# Patient Record
Sex: FEMALE | Race: BLACK OR AFRICAN AMERICAN | NOT HISPANIC OR LATINO | Employment: FULL TIME | RURAL
[De-identification: names, ages, dates, MRNs, and addresses within clinical notes are randomized per-mention and may not be internally consistent; named-entity substitution may affect disease eponyms.]

---

## 2017-01-14 ENCOUNTER — OFFICE VISIT (OUTPATIENT)
Dept: RETAIL CLINIC | Facility: CLINIC | Age: 52
End: 2017-01-14

## 2017-01-14 VITALS
BODY MASS INDEX: 47.09 KG/M2 | HEIGHT: 66 IN | OXYGEN SATURATION: 99 % | HEART RATE: 100 BPM | DIASTOLIC BLOOD PRESSURE: 70 MMHG | SYSTOLIC BLOOD PRESSURE: 128 MMHG | TEMPERATURE: 99.5 F | WEIGHT: 293 LBS

## 2017-01-14 DIAGNOSIS — H65.193 OTHER ACUTE NONSUPPURATIVE OTITIS MEDIA OF BOTH EARS, RECURRENCE NOT SPECIFIED: ICD-10-CM

## 2017-01-14 DIAGNOSIS — J01.10 ACUTE FRONTAL SINUSITIS, RECURRENCE NOT SPECIFIED: Primary | ICD-10-CM

## 2017-01-14 PROCEDURE — 99213 OFFICE O/P EST LOW 20 MIN: CPT | Performed by: NURSE PRACTITIONER

## 2017-01-14 PROCEDURE — 96372 THER/PROPH/DIAG INJ SC/IM: CPT | Performed by: NURSE PRACTITIONER

## 2017-01-14 RX ORDER — DEXAMETHASONE SODIUM PHOSPHATE 4 MG/ML
8 INJECTION, SOLUTION INTRA-ARTICULAR; INTRALESIONAL; INTRAMUSCULAR; INTRAVENOUS; SOFT TISSUE ONCE
Status: COMPLETED | OUTPATIENT
Start: 2017-01-14 | End: 2017-01-14

## 2017-01-14 RX ORDER — CEFTRIAXONE 2 G/1
2 INJECTION, POWDER, FOR SOLUTION INTRAMUSCULAR; INTRAVENOUS ONCE
Status: COMPLETED | OUTPATIENT
Start: 2017-01-14 | End: 2017-01-14

## 2017-01-14 RX ADMIN — CEFTRIAXONE 2 G: 2 INJECTION, POWDER, FOR SOLUTION INTRAMUSCULAR; INTRAVENOUS at 10:29

## 2017-01-14 RX ADMIN — DEXAMETHASONE SODIUM PHOSPHATE 8 MG: 4 INJECTION, SOLUTION INTRA-ARTICULAR; INTRALESIONAL; INTRAMUSCULAR; INTRAVENOUS; SOFT TISSUE at 10:30

## 2017-01-14 NOTE — PROGRESS NOTES
"Subjective   Francoise Vasquez is a 51 y.o. female. Patient comes into clinic today with concerns regarding:     \"Flu, sinuses, sneezing, coughing and aching body.\"    History of Present Illness  Sx started 2 days ago. +exposure to sick family member. +fever, sinus pain, sinus congestion, sore throat, ear pain, HA, PND, dry cough.     OTC Medications used:   Theraflu    The following portions of the patient's history were reviewed and updated as appropriate: allergies, current medications, past family history, past medical history, past social history, past surgical history and problem list.    Review of Systems   Constitutional: Positive for fever.   HENT: Positive for congestion, facial swelling, postnasal drip, sinus pressure and sneezing.    Respiratory: Positive for cough. Negative for shortness of breath and wheezing.    Cardiovascular: Negative for chest pain.   Gastrointestinal: Negative for abdominal pain, diarrhea, nausea and vomiting.   Neurological: Positive for headaches.       No Known Allergies    Past Medical History   Diagnosis Date   • Acid reflux    • Arthritis    • History of ear infection    • Hypertension    • Sinusitis          Current Outpatient Prescriptions:   •  Acetaminophen-Guaifenesin (THERAFLU FLU/CHEST CONGESTION PO), Take  by mouth., Disp: , Rfl:     Current Facility-Administered Medications:   •  cefTRIAXone (ROCEPHIN) injection 2 g, 2 g, Intramuscular, Once, JOHNATHON Fajardo  •  dexamethasone (DECADRON) injection 8 mg, 8 mg, Intramuscular, Once, JOHNATHON Fajardo      Objective   Physical Exam   Constitutional: She is oriented to person, place, and time. She appears well-developed and well-nourished. She appears ill.   HENT:   Right Ear: External ear and ear canal normal. Tympanic membrane is injected and erythematous. A middle ear effusion is present.   Left Ear: External ear and ear canal normal. Tympanic membrane is injected and erythematous. A middle ear effusion is " "present.   Nose: Mucosal edema and rhinorrhea present. Right sinus exhibits frontal sinus tenderness. Left sinus exhibits frontal sinus tenderness.   Mouth/Throat: Uvula is midline. Posterior oropharyngeal erythema present. No oropharyngeal exudate or posterior oropharyngeal edema.   Eyes: Conjunctivae are normal. Pupils are equal, round, and reactive to light.   Neck: Neck supple. No thyromegaly present.   Cardiovascular: Normal rate and regular rhythm.    Pulmonary/Chest: Effort normal.   Slightly congested in bilateral upper lobes   Lymphadenopathy:        Head (right side): Submandibular, tonsillar and preauricular adenopathy present.        Head (left side): Submandibular, tonsillar and preauricular adenopathy present.     She has no cervical adenopathy.        Right: No supraclavicular adenopathy present.        Left: No supraclavicular adenopathy present.   Neurological: She is alert and oriented to person, place, and time.   CN II-XII grossly intact     Skin: Skin is warm and dry.   Psychiatric: She has a normal mood and affect. Her behavior is normal.   Vitals reviewed.      Visit Vitals   • /70 (BP Location: Left arm, Patient Position: Sitting, Cuff Size: Adult)   • Pulse 100   • Temp 99.5 °F (37.5 °C) (Tympanic)   • Ht 66\" (167.6 cm)   • Wt 294 lb (133 kg)   • SpO2 99%   • BMI 47.45 kg/m2       Assessment/Plan   Francoise was seen today for sinus problem, generalized body aches and flu symptoms.    Diagnoses and all orders for this visit:    Acute frontal sinusitis, recurrence not specified  -     cefTRIAXone (ROCEPHIN) injection 2 g; Inject 2 g into the shoulder, thigh, or buttocks 1 (One) Time.  -     dexamethasone (DECADRON) injection 8 mg; Inject 2 mL into the shoulder, thigh, or buttocks 1 (One) Time.    Other acute nonsuppurative otitis media of both ears, recurrence not specified  -     cefTRIAXone (ROCEPHIN) injection 2 g; Inject 2 g into the shoulder, thigh, or buttocks 1 (One) Time.   "     -Discussed dx with Patient  -Medication administration instructions given. 2G Rocephin and 8mg Decadron given to patient by SAVANNAH Chambers. Patient tolerated well.   -If sx worsen or do not improve seek higher level care  -Patient expressed verbal understanding of plan of care and rationale for interventions.    Declined work excuse note.

## 2017-01-14 NOTE — PATIENT INSTRUCTIONS
-Discussed dx with Patient  -Medication administration instructions given. 2G Rocephin and 8mg Decadron given to patient by SAVANNAH Chambers. Patient tolerated well.   -If sx worsen or do not improve seek higher level care  -Patient expressed verbal understanding of plan of care and rationale for interventions.    Declined work excuse note.

## 2017-01-24 ENCOUNTER — OFFICE VISIT (OUTPATIENT)
Dept: RETAIL CLINIC | Facility: CLINIC | Age: 52
End: 2017-01-24

## 2017-01-24 DIAGNOSIS — R52 PAIN AT INJECTION SITE, INITIAL ENCOUNTER: Primary | ICD-10-CM

## 2017-01-24 DIAGNOSIS — T80.89XA PAIN AT INJECTION SITE, INITIAL ENCOUNTER: Primary | ICD-10-CM

## 2017-01-24 NOTE — LETTER
January 24, 2017     Patient: Francoise Vasquez   YOB: 1965   Date of Visit: 1/24/2017       To Whom It May Concern:    It is my medical opinion that Francoise Vasquez may return to work on today.           Sincerely,        JOHNATHON Staton    PROVIDER [facility]    CC: No Recipients

## 2017-01-24 NOTE — PROGRESS NOTES
Patient seen by JOHNATHON Stubbs on 1/14/17 and given Rocephin and Decadron injections.  States that she began noticing some soreness to injection site on right gluteal area last night.  No fever or warmth to area, just TTP.  Review of chart reveals that Rocephin was received to right gluteal, Decadron on Left.  Examined area on patient and there is mild ecchymosis and TTP, but no erythema, warmth or induration.  Have discussed with patient the signs/symptoms of an abscess to watch for--increased swelling, pain, hardness over the area, heat over the area and if these occur, f/u immediately.

## 2017-03-17 ENCOUNTER — OFFICE VISIT (OUTPATIENT)
Dept: RETAIL CLINIC | Facility: CLINIC | Age: 52
End: 2017-03-17

## 2017-03-17 VITALS
OXYGEN SATURATION: 98 % | RESPIRATION RATE: 18 BRPM | WEIGHT: 293 LBS | HEIGHT: 66 IN | TEMPERATURE: 98.5 F | HEART RATE: 94 BPM | BODY MASS INDEX: 47.09 KG/M2

## 2017-03-17 DIAGNOSIS — J20.9 ACUTE BRONCHITIS, UNSPECIFIED ORGANISM: ICD-10-CM

## 2017-03-17 DIAGNOSIS — J01.90 ACUTE SINUSITIS, RECURRENCE NOT SPECIFIED, UNSPECIFIED LOCATION: Primary | ICD-10-CM

## 2017-03-17 PROCEDURE — 99213 OFFICE O/P EST LOW 20 MIN: CPT | Performed by: NURSE PRACTITIONER

## 2017-03-17 RX ORDER — AMOXICILLIN AND CLAVULANATE POTASSIUM 875; 125 MG/1; MG/1
1 TABLET, FILM COATED ORAL 2 TIMES DAILY
Qty: 20 TABLET | Refills: 0 | Status: SHIPPED | OUTPATIENT
Start: 2017-03-17

## 2017-03-17 RX ORDER — FLUCONAZOLE 150 MG/1
150 TABLET ORAL ONCE
Qty: 2 TABLET | Refills: 0 | Status: SHIPPED | OUTPATIENT
Start: 2017-03-17 | End: 2017-03-17

## 2017-03-17 RX ORDER — ALBUTEROL SULFATE 90 UG/1
2 AEROSOL, METERED RESPIRATORY (INHALATION) EVERY 4 HOURS PRN
Qty: 18 G | Refills: 0 | Status: SHIPPED | OUTPATIENT
Start: 2017-03-17

## 2017-03-17 RX ORDER — METHYLPREDNISOLONE 4 MG/1
TABLET ORAL
Qty: 21 TABLET | Refills: 0 | Status: SHIPPED | OUTPATIENT
Start: 2017-03-17

## 2017-03-17 RX ORDER — DEXTROMETHORPHAN HYDROBROMIDE AND PROMETHAZINE HYDROCHLORIDE 15; 6.25 MG/5ML; MG/5ML
SYRUP ORAL
Qty: 120 ML | Refills: 0 | Status: SHIPPED | OUTPATIENT
Start: 2017-03-17

## 2017-03-17 RX ORDER — CETIRIZINE HYDROCHLORIDE 10 MG/1
10 TABLET ORAL DAILY
Qty: 30 TABLET | Refills: 0 | Status: SHIPPED | OUTPATIENT
Start: 2017-03-17

## 2017-03-17 NOTE — PROGRESS NOTES
"Subjective   Francoise Vasquez is a 51 y.o. female.     Sinusitis   This is a new problem. The current episode started in the past 7 days. The problem is unchanged. There has been no fever. She is experiencing no pain. Associated symptoms include congestion, coughing ( worse at night), ear pain ( pressure), headaches, sinus pressure ( frontal) and a sore throat ( scratchy). Pertinent negatives include no chills, diaphoresis, hoarse voice, neck pain, shortness of breath, sneezing or swollen glands. Treatments tried: otc cough med. The treatment provided no relief.        The following portions of the patient's history were reviewed and updated as appropriate: allergies, current medications, past medical history and past social history.    Review of Systems   Constitutional: Negative for activity change, appetite change, chills, diaphoresis and fever.   HENT: Positive for congestion, ear pain ( pressure), postnasal drip, rhinorrhea ( predominantly clear, occasional yellow), sinus pressure ( frontal) and sore throat ( scratchy). Negative for hoarse voice and sneezing.    Eyes: Negative.    Respiratory: Positive for cough ( worse at night), chest tightness and wheezing ( at night). Negative for shortness of breath.    Cardiovascular: Negative.    Gastrointestinal: Negative for abdominal pain, diarrhea, nausea and vomiting.   Musculoskeletal: Negative for myalgias, neck pain and neck stiffness.   Skin: Negative.    Neurological: Positive for headaches. Negative for dizziness.   Hematological: Negative for adenopathy.   Psychiatric/Behavioral: Negative.        Objective    Visit Vitals   • Pulse 94   • Temp 98.5 °F (36.9 °C) (Tympanic)   • Resp 18   • Ht 66\" (167.6 cm)   • Wt 297 lb (135 kg)   • SpO2 98%   • Breastfeeding No   • BMI 47.94 kg/m2       Physical Exam   Constitutional: She is oriented to person, place, and time. She appears well-developed and well-nourished. No distress.   HENT:   Head: Normocephalic and " atraumatic.   Right Ear: Ear canal normal. Tympanic membrane is not erythematous ( dull, slightly decreased light reflex).   Left Ear: Tympanic membrane and ear canal normal.   Nose: Mucosal edema and rhinorrhea ( clear) present. Right sinus exhibits frontal sinus tenderness ( mild). Right sinus exhibits no maxillary sinus tenderness. Left sinus exhibits frontal sinus tenderness ( mild). Left sinus exhibits no maxillary sinus tenderness.   Mouth/Throat: Uvula is midline and mucous membranes are normal. No oropharyngeal exudate or posterior oropharyngeal erythema ( +PND).   Eyes: Conjunctivae are normal.   Neck: Normal range of motion. Neck supple.   Cardiovascular: Normal rate and regular rhythm.    Pulmonary/Chest: Effort normal. She has wheezes (slightly decreased aeration with tight, wheezy cough). She has no rales.   Lymphadenopathy:     She has no cervical adenopathy.   Neurological: She is alert and oriented to person, place, and time.   Psychiatric: She has a normal mood and affect. Her behavior is normal.   Nursing note and vitals reviewed.      Assessment/Plan   Francoise was seen today for sinusitis.    Diagnoses and all orders for this visit:    Acute sinusitis, recurrence not specified, unspecified location  -     MethylPREDNISolone (MEDROL, DANNY,) 4 MG tablet; Take as directed on package instructions.  -     cetirizine (zyrTEC) 10 MG tablet; Take 1 tablet by mouth Daily.  -     amoxicillin-clavulanate (AUGMENTIN) 875-125 MG per tablet; Take 1 tablet by mouth 2 (Two) Times a Day. If worsening sinus symptoms over the next week    Acute bronchitis, unspecified organism  -     MethylPREDNISolone (MEDROL, DANNY,) 4 MG tablet; Take as directed on package instructions.  -     albuterol (PROVENTIL HFA;VENTOLIN HFA) 108 (90 BASE) MCG/ACT inhaler; Inhale 2 puffs Every 4 (Four) Hours As Needed for Wheezing or Shortness of Air.  -     promethazine-dextromethorphan (PROMETHAZINE-DM) 6.25-15 MG/5ML syrup; 1-2 tsp po QHS prn  cough    Other orders  -     fluconazole (DIFLUCAN) 150 MG tablet; Take 1 tablet by mouth 1 (One) Time for 1 dose. Prn yeast.  May repeat in 4-7 days      Push fluids  Rest  Tylenol or Motrin as needed  Appears more allergy/viral at this time.  Augmentin only if persistent purulent nasal discharge, localized sinus pain or fever.      RTW:  3-20-17       PCP if symptoms persist/worsen

## 2017-03-17 NOTE — PATIENT INSTRUCTIONS
Acute Bronchitis  Bronchitis is inflammation of the airways that extend from the windpipe into the lungs (bronchi). The inflammation often causes mucus to develop. This leads to a cough, which is the most common symptom of bronchitis.   In acute bronchitis, the condition usually develops suddenly and goes away over time, usually in a couple weeks. Smoking, allergies, and asthma can make bronchitis worse. Repeated episodes of bronchitis may cause further lung problems.   CAUSES  Acute bronchitis is most often caused by the same virus that causes a cold. The virus can spread from person to person (contagious) through coughing, sneezing, and touching contaminated objects.  SIGNS AND SYMPTOMS   · Cough.    · Fever.    · Coughing up mucus.    · Body aches.    · Chest congestion.    · Chills.    · Shortness of breath.    · Sore throat.    DIAGNOSIS   Acute bronchitis is usually diagnosed through a physical exam. Your health care provider will also ask you questions about your medical history. Tests, such as chest X-rays, are sometimes done to rule out other conditions.   TREATMENT   Acute bronchitis usually goes away in a couple weeks. Oftentimes, no medical treatment is necessary. Medicines are sometimes given for relief of fever or cough. Antibiotic medicines are usually not needed but may be prescribed in certain situations. In some cases, an inhaler may be recommended to help reduce shortness of breath and control the cough. A cool mist vaporizer may also be used to help thin bronchial secretions and make it easier to clear the chest.   HOME CARE INSTRUCTIONS  · Get plenty of rest.    · Drink enough fluids to keep your urine clear or pale yellow (unless you have a medical condition that requires fluid restriction). Increasing fluids may help thin your respiratory secretions (sputum) and reduce chest congestion, and it will prevent dehydration.    · Take medicines only as directed by your health care provider.  · If  you were prescribed an antibiotic medicine, finish it all even if you start to feel better.  · Avoid smoking and secondhand smoke. Exposure to cigarette smoke or irritating chemicals will make bronchitis worse. If you are a smoker, consider using nicotine gum or skin patches to help control withdrawal symptoms. Quitting smoking will help your lungs heal faster.    · Reduce the chances of another bout of acute bronchitis by washing your hands frequently, avoiding people with cold symptoms, and trying not to touch your hands to your mouth, nose, or eyes.    · Keep all follow-up visits as directed by your health care provider.    SEEK MEDICAL CARE IF:  Your symptoms do not improve after 1 week of treatment.   SEEK IMMEDIATE MEDICAL CARE IF:  · You develop an increased fever or chills.    · You have chest pain.    · You have severe shortness of breath.  · You have bloody sputum.    · You develop dehydration.  · You faint or repeatedly feel like you are going to pass out.  · You develop repeated vomiting.  · You develop a severe headache.  MAKE SURE YOU:   · Understand these instructions.  · Will watch your condition.  · Will get help right away if you are not doing well or get worse.     This information is not intended to replace advice given to you by your health care provider. Make sure you discuss any questions you have with your health care provider.     Document Released: 01/25/2006 Document Revised: 01/08/2016 Document Reviewed: 06/10/2014  [employer] Interactive Patient Education ©2016 [employer]  Sinusitis, Adult  Sinusitis is redness, soreness, and inflammation of the paranasal sinuses. Paranasal sinuses are air pockets within the bones of your face. They are located beneath your eyes, in the middle of your forehead, and above your eyes. In healthy paranasal sinuses, mucus is able to drain out, and air is able to circulate through them by way of your nose. However, when your paranasal sinuses are inflamed,  mucus and air can become trapped. This can allow bacteria and other germs to grow and cause infection.  Sinusitis can develop quickly and last only a short time (acute) or continue over a long period (chronic). Sinusitis that lasts for more than 12 weeks is considered chronic.  CAUSES  Causes of sinusitis include:  · Allergies.  · Structural abnormalities, such as displacement of the cartilage that separates your nostrils (deviated septum), which can decrease the air flow through your nose and sinuses and affect sinus drainage.  · Functional abnormalities, such as when the small hairs (cilia) that line your sinuses and help remove mucus do not work properly or are not present.  SIGNS AND SYMPTOMS  Symptoms of acute and chronic sinusitis are the same. The primary symptoms are pain and pressure around the affected sinuses. Other symptoms include:  · Upper toothache.  · Earache.  · Headache.  · Bad breath.  · Decreased sense of smell and taste.  · A cough, which worsens when you are lying flat.  · Fatigue.  · Fever.  · Thick drainage from your nose, which often is green and may contain pus (purulent).  · Swelling and warmth over the affected sinuses.  DIAGNOSIS  Your health care provider will perform a physical exam. During your exam, your health care provider may perform any of the following to help determine if you have acute sinusitis or chronic sinusitis:  · Look in your nose for signs of abnormal growths in your nostrils (nasal polyps).  · Tap over the affected sinus to check for signs of infection.  · View the inside of your sinuses using an imaging device that has a light attached (endoscope).  If your health care provider suspects that you have chronic sinusitis, one or more of the following tests may be recommended:  · Allergy tests.  · Nasal culture. A sample of mucus is taken from your nose, sent to a lab, and screened for bacteria.  · Nasal cytology. A sample of mucus is taken from your nose and examined by  your health care provider to determine if your sinusitis is related to an allergy.  TREATMENT  Most cases of acute sinusitis are related to a viral infection and will resolve on their own within 10 days. Sometimes, medicines are prescribed to help relieve symptoms of both acute and chronic sinusitis. These may include pain medicines, decongestants, nasal steroid sprays, or saline sprays.  However, for sinusitis related to a bacterial infection, your health care provider will prescribe antibiotic medicines. These are medicines that will help kill the bacteria causing the infection.  Rarely, sinusitis is caused by a fungal infection. In these cases, your health care provider will prescribe antifungal medicine.  For some cases of chronic sinusitis, surgery is needed. Generally, these are cases in which sinusitis recurs more than 3 times per year, despite other treatments.  HOME CARE INSTRUCTIONS  · Drink plenty of water. Water helps thin the mucus so your sinuses can drain more easily.  · Use a humidifier.  · Inhale steam 3-4 times a day (for example, sit in the bathroom with the shower running).  · Apply a warm, moist washcloth to your face 3-4 times a day, or as directed by your health care provider.  · Use saline nasal sprays to help moisten and clean your sinuses.  · Take medicines only as directed by your health care provider.  · If you were prescribed either an antibiotic or antifungal medicine, finish it all even if you start to feel better.  SEEK IMMEDIATE MEDICAL CARE IF:  · You have increasing pain or severe headaches.  · You have nausea, vomiting, or drowsiness.  · You have swelling around your face.  · You have vision problems.  · You have a stiff neck.  · You have difficulty breathing.     This information is not intended to replace advice given to you by your health care provider. Make sure you discuss any questions you have with your health care provider.     Document Released: 12/18/2006 Document  Revised: 01/08/2016 Document Reviewed: 10/12/2016  [employer] Interactive Patient Education ©2016 [employer]

## 2018-02-26 ENCOUNTER — TRANSCRIBE ORDERS (OUTPATIENT)
Dept: ORTHOPEDIC SURGERY | Facility: CLINIC | Age: 53
End: 2018-02-26

## 2018-02-26 DIAGNOSIS — M25.531 RIGHT WRIST PAIN: Primary | ICD-10-CM

## 2018-03-05 ENCOUNTER — TRANSCRIBE ORDERS (OUTPATIENT)
Dept: PHYSICAL THERAPY | Facility: HOSPITAL | Age: 53
End: 2018-03-05

## 2018-03-05 DIAGNOSIS — M54.32 SCIATICA OF LEFT SIDE: ICD-10-CM

## 2018-03-05 DIAGNOSIS — M54.9 BACK PAIN, UNSPECIFIED BACK LOCATION, UNSPECIFIED BACK PAIN LATERALITY, UNSPECIFIED CHRONICITY: Primary | ICD-10-CM

## 2018-03-29 ENCOUNTER — TRANSCRIBE ORDERS (OUTPATIENT)
Dept: ORTHOPEDIC SURGERY | Facility: CLINIC | Age: 53
End: 2018-03-29

## 2018-03-29 DIAGNOSIS — M54.42 LOW BACK PAIN WITH LEFT-SIDED SCIATICA, UNSPECIFIED BACK PAIN LATERALITY, UNSPECIFIED CHRONICITY: Primary | ICD-10-CM
